# Patient Record
(demographics unavailable — no encounter records)

---

## 2025-06-09 NOTE — ASSESSMENT
[FreeTextEntry1] : This is a case of a 33-year-old woman who appears to be having nonmigraine's headache that and some criteria consistent with a paroxysmal attacks but it is not strictly unilateral.  There is no associated autonomic features.  Her examination entirely normal.  Will recommend a trial of Indocin 25 mg 1 twice daily to be increased up to 2 twice daily if necessary.  She has been advised to take this medication with food.  Will follow-up in 6 to 8 weeks for reevaluation.  I spent 45 minutes reviewing history, medical records, clinical evaluation, management, discussion of plan.

## 2025-06-09 NOTE — HISTORY OF PRESENT ILLNESS
[FreeTextEntry1] : Is a case of a 33-year-old woman who presents with headaches that are episodic and recurrent.  These headaches are unilateral or bilateral lasting seconds up to 20 attacks per day.  She denies any nasal congestion or drainage.  Denies nausea vomiting photophobia or phonophobia.  No imaging done.  Family history, past medical history, allergies and current medications were reviewed in detail with patient.

## 2025-06-09 NOTE — PHYSICAL EXAM
[FreeTextEntry1] : Constitutional: No signs of distress or signs of toxicity. Mental Status: Alert and well oriented. Speech fluent. No aphasia. Fund of knowledge intact. Psychiatric: Mood stable Cranial Nerve: PERRLA: No papilledema; No VFC; EOM full. no nystagmus. No Nadeen. V1-3 intact. No facial asymmetry, hearing grossly intact; palate elevates symmetrically, tongue midline Motor: No involuntary movements noted.  Adequate bulk, tone throughout, 5/5 strength of all muscle groups DTR: present and symmetrical; no clonus, plantars  downgoing Sensory: intact to primary and secondary modalities; neg Romberg Cerebellar: adequate finger to nose and heel to shin bilaterally. Gait: non antalgic or ataxic. Eyes: no redness or swelling HEENT: intact; no signs of trauma. Neck: No masses noted Pulmonary: no respiratory distress Vascular: no temperature, color change or sudomotor changes.; no edema Musculoskeletal: examination of the cervical spine reveals no midline tenderness, range of motion full upon flexion, extension and lateral rotation. Negative facet tenderness, Negative Spurlings bilaterally. examination of the lumbar spine reveals no midline or paraspinal tenderness; Range of motion full upon flexion, extension and lateral rotation; negative facet loading, No tenderness of sciatic notch, No tenderness of bilateral greater trochanters, Negative ETHAN, negative SLRT bilaterally, Skin: No rash.